# Patient Record
Sex: FEMALE | Race: WHITE
[De-identification: names, ages, dates, MRNs, and addresses within clinical notes are randomized per-mention and may not be internally consistent; named-entity substitution may affect disease eponyms.]

---

## 2019-01-08 ENCOUNTER — HOSPITAL ENCOUNTER (OUTPATIENT)
Dept: HOSPITAL 10 - SDS | Age: 69
Discharge: HOME | End: 2019-01-08
Attending: OTOLARYNGOLOGY
Payer: COMMERCIAL

## 2019-01-08 VITALS
HEIGHT: 64 IN | BODY MASS INDEX: 31.65 KG/M2 | WEIGHT: 185.39 LBS | HEIGHT: 64 IN | WEIGHT: 185.39 LBS | BODY MASS INDEX: 31.65 KG/M2

## 2019-01-08 VITALS — RESPIRATION RATE: 24 BRPM | DIASTOLIC BLOOD PRESSURE: 95 MMHG | HEART RATE: 88 BPM | SYSTOLIC BLOOD PRESSURE: 165 MMHG

## 2019-01-08 VITALS — RESPIRATION RATE: 17 BRPM | HEART RATE: 84 BPM | SYSTOLIC BLOOD PRESSURE: 133 MMHG | DIASTOLIC BLOOD PRESSURE: 49 MMHG

## 2019-01-08 VITALS — DIASTOLIC BLOOD PRESSURE: 92 MMHG | SYSTOLIC BLOOD PRESSURE: 197 MMHG | RESPIRATION RATE: 17 BRPM

## 2019-01-08 VITALS — SYSTOLIC BLOOD PRESSURE: 150 MMHG | DIASTOLIC BLOOD PRESSURE: 72 MMHG | RESPIRATION RATE: 24 BRPM | HEART RATE: 92 BPM

## 2019-01-08 VITALS — RESPIRATION RATE: 15 BRPM | HEART RATE: 88 BPM | DIASTOLIC BLOOD PRESSURE: 86 MMHG | SYSTOLIC BLOOD PRESSURE: 182 MMHG

## 2019-01-08 VITALS — SYSTOLIC BLOOD PRESSURE: 182 MMHG | HEART RATE: 88 BPM | RESPIRATION RATE: 14 BRPM | DIASTOLIC BLOOD PRESSURE: 77 MMHG

## 2019-01-08 VITALS — SYSTOLIC BLOOD PRESSURE: 143 MMHG | HEART RATE: 52 BPM | RESPIRATION RATE: 18 BRPM | DIASTOLIC BLOOD PRESSURE: 69 MMHG

## 2019-01-08 VITALS — SYSTOLIC BLOOD PRESSURE: 201 MMHG | DIASTOLIC BLOOD PRESSURE: 93 MMHG | RESPIRATION RATE: 18 BRPM

## 2019-01-08 VITALS — DIASTOLIC BLOOD PRESSURE: 70 MMHG | RESPIRATION RATE: 18 BRPM | SYSTOLIC BLOOD PRESSURE: 153 MMHG | HEART RATE: 52 BPM

## 2019-01-08 VITALS — HEART RATE: 90 BPM | RESPIRATION RATE: 15 BRPM | DIASTOLIC BLOOD PRESSURE: 68 MMHG | SYSTOLIC BLOOD PRESSURE: 154 MMHG

## 2019-01-08 VITALS — SYSTOLIC BLOOD PRESSURE: 143 MMHG | RESPIRATION RATE: 10 BRPM | DIASTOLIC BLOOD PRESSURE: 67 MMHG | HEART RATE: 86 BPM

## 2019-01-08 VITALS — HEART RATE: 84 BPM | DIASTOLIC BLOOD PRESSURE: 85 MMHG | RESPIRATION RATE: 16 BRPM | SYSTOLIC BLOOD PRESSURE: 187 MMHG

## 2019-01-08 VITALS — SYSTOLIC BLOOD PRESSURE: 143 MMHG | RESPIRATION RATE: 10 BRPM | HEART RATE: 86 BPM | DIASTOLIC BLOOD PRESSURE: 67 MMHG

## 2019-01-08 VITALS — SYSTOLIC BLOOD PRESSURE: 154 MMHG | DIASTOLIC BLOOD PRESSURE: 71 MMHG | HEART RATE: 90 BPM | RESPIRATION RATE: 23 BRPM

## 2019-01-08 VITALS — HEART RATE: 88 BPM | RESPIRATION RATE: 13 BRPM | SYSTOLIC BLOOD PRESSURE: 154 MMHG | DIASTOLIC BLOOD PRESSURE: 76 MMHG

## 2019-01-08 DIAGNOSIS — I10: ICD-10-CM

## 2019-01-08 DIAGNOSIS — J34.2: Primary | ICD-10-CM

## 2019-01-08 DIAGNOSIS — J34.3: ICD-10-CM

## 2019-01-08 DIAGNOSIS — J32.3: ICD-10-CM

## 2019-01-08 PROCEDURE — 88304 TISSUE EXAM BY PATHOLOGIST: CPT

## 2019-01-08 PROCEDURE — 30520 REPAIR OF NASAL SEPTUM: CPT

## 2019-01-08 PROCEDURE — 88311 DECALCIFY TISSUE: CPT

## 2019-01-08 PROCEDURE — 30140 RESECT INFERIOR TURBINATE: CPT

## 2019-01-08 NOTE — PAC
Date/Time of Note


Date/Time of Note


DATE: 1/8/19 


TIME: 14:11





Post-Anesthesia Notes


Post-Anesthesia Note


Last documented vital signs





Vital Signs


  Date      Temp  Pulse  Resp  B/P (MAP)   Pulse Ox  O2          O2 Flow    FiO2


Time                                                 Delivery    Rate


    1/8/19  97.8


     14:10


    1/8/19           52    18      153/70        97  Room Air


     09:39                           (97)





Activity:  WNL


Respiratory function:  WNL


Cardiovascular function:  WNL


Mental status:  Baseline


Pain reasonably controlled:  Yes


Hydration appropriate:  Yes


Nausea/Vomiting absent:  Yes


Comments


BP: 133/89


HR: 90


RR: 15


T: 97.8


SaO2: 99%











JAYDON ANDRADE MD                 Jan 8, 2019 14:12

## 2019-01-08 NOTE — PREAC
Date/Time of Note


Date/Time of Note


DATE: 1/8/19 


TIME: 12:42





Anesthesia Eval and Record


Evaluation


Time Pre-Procedure Interview


DATE: 1/8/19 


TIME: 12:42


Age


68


Sex


female


NPO:  8 hrs


Preoperative diagnosis


chronic nasal obstruction


Planned procedure


bilateral laser turbinoplasty, septoplasty, sphenoid sinus sinusotomies





Past Medical History


Past Medical History:  Includes


Cardio:  HTN


Neuro:  Other (migraines)


Renal:  Other (chronic cystitis)


GI:  Other (mandible fx s/p surgeries, peptic ulcer disease)





Surgery & Anesthesia Issues


No known issue





Meds


Anticoagulation:  No


Beta Blocker within 24 hr:  Yes


Reason Beta Blocker not given:  Bradycarida, Hypotension


Reported Medications


Multivitamin* (Daily Value*) 1 Each Tablet, 1 TAB PO DAILY, TAB


   1/8/19


Ascorbic Acid (Vitamin C) 500 Mg Tab, 1000 MG PO DAILY, TAB


   1/8/19


Magnesium Oxide (Magnesium) 250 Mg Tablet, 250 MG PO DAILY, TAB


   1/8/19


Melatonin (Melatonin) 5 Mg Tablet, 5 MG PO HS, TAB


   1/8/19


Cyclobenzaprine Hcl* (Cyclobenzaprine Hcl*) 10 Mg Tablet, 10 MG PO Q8 PRN for 


MUSCLE SPASMS, #60 TAB


   1/8/19


Trihexyphenidyl Hcl* (Trihexyphenidyl Hcl*) 2 Mg Tab, 2 MG PO QHS, #120 TAB


   1/8/19


Omega-3/Dha/Epa/Fish Oil (FISH  MG SOFTGEL) 1 Each Capsule, 1 EACH PO, 


CAP


   1/4/19


Hydrocodone Bit-Acetaminophen* (Vicodin*) 5-300 Tab, 1 TAB PO Q4H PRN for PAIN, 


TAB


   1/4/19


Metoprolol Succinate* (Toprol XL*) 25 Mg Tab.sr.24h, 25 MG PO DAILY, #30 TAB


   1/4/19


Temazepam* (Temazepam*) 30 Mg Capsule, 30 MG PO HS PRN for INSOMNIA, CAP


   1/4/19


Loxapine Succinate (Loxapine) 50 Mg Capsule, 40 MG PO PC MEALS BEDTIME, CAP


   1/4/19


Meds reviewed:  Yes





Allergies


Coded Allergies:  


     morphine (Verified  Allergy, Severe, VOMITING, 1/8/19)


Allergies Reviewed:  Yes





Labs/Studies


Labs Reviewed:  Reviewed by anesthesiologist


Pregnancy test:  N/A





Pre-procedure Exam


Last vitals





Vital Signs


  Date      Temp  Pulse  Resp  B/P (MAP)   Pulse Ox  O2          O2 Flow    FiO2


Time                                                 Delivery    Rate


    1/8/19  98.8     52    18      153/70        97  Room Air


     09:39                           (97)





Airway:  Adequate mouth opening (decreased mouth opening), Adequate thyromental 


dist


Mallampati:  Mallampati III


Teeth:  Normal


Lung:  Normal


Heart:  Normal





ASA Physical Status


ASA physical status:  2


Emergency:  None





Planned Anesthetic


General/MAC:  ETT





Planned Pain Management


Parenteral pain med, Local by surgeon





Pre-operative Attestations


Prior to commencing anesthesia and surgery, the patient was re-evaluated, there 


was verification of:


*The patient's identity


*The results of appropriate recent lab work and preoperative vital signs


*The above evaluation not changing prior to induction


*Anesthetic plan, risk benefits, alternative and complications discussed with 


patient/family; questions answered; patient/family understands, accepts and 


wishes to proceed.











JAYDON ANDRADE MD                 Jan 8, 2019 12:47

## 2019-01-08 NOTE — PDOCDIS
Discharge Instructions


DIAGNOSIS


Discharge Diagnosis


1. SEPTAL DEVIATION.


2. BILATERAL NASAL TURBINATE HYPERTROPHY.


3. SPHENOID SINUSITIS. 


4. CHRONIC NASAL OBSTRUCTION.





CONDITION


                 [id number]
Patient Condition:  Oazio6o
Good








HOME CARE INSTRUCTIONS:


                [id number]
Diet Instructions:  Auyaq2b
Regular








ACTIVITY:


     Volch2Ao
Activity Restrictions:  Wfgbt3h
Slowly Increase Activity


                                        Rest between Activity


                                        Avoid heavy lifting


                                        Avoid Heavy Housework


     [id number]
Bathing Restrictions:   Zytoo8n
Tub Bath








FOLLOW UP/APPOINTMENTS


Follow-up Plan


MY OFFICE IN 7 TO 10 DAYS.





SCHOOL/WORK RELEASE


May return to School/Work on:  Vladimir 15, 2019


May return to School/Work with:  No Restrictions











CHARLEE SRINIVASAN M.D.         Jan 8, 2019 13:57

## 2019-01-08 NOTE — NUR
TRANSFERRED TO hospitals IN STABLE CONDITION .PAIN RESOLVED .MUSTACHE DRESSING DRY AND INTACT 
.REPORT GIVEN TO DINA EMERY.

## 2019-01-08 NOTE — OPR
Date/Time of Note


Date/Time of Note


DATE: 1/8/19 


TIME: 13:49





Operative Report


Procedure Date:  Jan 8, 2019


Preoperative Diagnosis


1. SEPTAL DEVIATION.


2. BILATERAL NASAL TURBINATE HYPERTROPHY.


3. SPHENOID SINUSITIS. 


4. CHRONIC NASAL OBSTRUCTION.


Postoperative Diagnosis


SAME.


Operation/Procedure Performed


1. SEPTOPLASTY VIA SMR.


2. BILATERAL NASAL TURBINATE REDUCTION USING  NM LASER VIA SMR. 


3. SPHENOID SINUS IRRIGATION.


Surgeon


see signature line


Assistant


NONE.


Anesthesia Type:  general (WITH OT TUBE INTUBATION. 16 CC 1% LIDOCAINE WITH EPI 


1:100,000 SOLN. 4 CC TOPICAL COCCAINE )


Estimated Blood Loss:  0 - 10 ml's


Transfusion


   none


Specimen


SEPTAL CARTILAGE AND BONE.


Grafts/Implants


none


Tubes/Drains


NONE.


Complications


none


Pt Condition Post Procedure:  stable


Disposition:  PACU


Indications


TO RID INFECTION AND IMPROVE BREATHING.


Procedure Description


SEE DICTATED OPERATED REPORT.











CHARLEE SRINIVASAN M.D.         Jan 8, 2019 13:55

## 2019-01-08 NOTE — NUR
RECEIVED PATIENT FROM OR VIA BED POST BILATERAL TURBINOPLASTY SEPTOPLASTY UNDER GENERAL 
ANESTHESIA. PATIENT AWAKE ON ARRIVAL AND /93 .PATIENT ALSO C/O PAIN 5/10 .SR .ON ROOM 
AIR SATURATING.96 %.

## 2019-01-08 NOTE — HPN
Date/Time of Note


Date/Time of Note


DATE: 1/8/19 


TIME: 12:32





Interval H&P Admission Note


Pt. seen H&P reviewed:  No system changes











CHARLEE SRINIVASAN M.D.         Jan 8, 2019 12:32

## 2019-01-09 NOTE — OPR
DATE OF OPERATION:  01/08/2019

 

 

SURGEON:  Cayden Reyes MD

 

PREOPERATIVE DIAGNOSES:

1.  Septal deviation.

2.  Sphenoid sinusitis.

3.  Bilateral nasal turbinate tissue hypertrophy.

4.  Chronic nasal obstruction.

 

POSTOPERATIVE DIAGNOSES:

1.  Septal deviation.

2.  Sphenoid sinusitis.

3.  Bilateral nasal turbinate tissue hypertrophy.

4.  Chronic nasal obstruction.

 

OPERATION PERFORMED:

1.  A septoplasty using submucosal resection technique.

2.  Bilateral laser turbinoplasty procedure using 532 nanometer KTP laser using 

submucosal resection technique.

3.  Sphenoid sinus irrigation.

 

ESTIMATED BLOOD LOSS:  Approximately 10 mL.

 

COMPLICATIONS:  No complications.

 

SPECIMENS SENT TO LAB:  Septal cartilage and bone for gross and microscopic 

evaluation.

 

ANESTHETIC USED:  General anesthesia with a local infiltrate of 16 mL of 1% 

lidocaine with epinephrine 1:100,000 solution using a 23-gauge spinal needle.  

The patient also had IV Ancef and Decadron before the case was begun.  The 

patient also received topical cocaine 4% using 4 mL on cottonoids.

 

INDICATIONS:  Ms. Viviane Bradford is a 68-year-old female who has a history of 

chronic sphenoid sinusitis treated with multiple antibiotic medications which 

have met with failure.  The patient continues to have headaches as well as 

chronic nasal obstruction.  The patient has been found preoperatively to have 

septal deviation and bilaterally enlarged nasal turbinates.  The patient is 

currently scheduled for today's procedure.  Risks, benefits, and alternatives 

have been explained thoroughly to Ms. Bradford include infection, bleeding, scar 

formation, possible septal perforation as well as possible reaction to local and

general anesthetic agents used during the procedure.  She has signed consent 

once her questions were answered.

 

FINDINGS DURING PROCEDURE:  Right nasal septal deviation with papillomatous 

degenerative mucosal changes of the nasal cavity.  The patient was also found to

have chronic sphenoid sinusitis without any malignancies or tumors seen during 

the procedure.  The patient was also found to have enlarged turbinates 

bilaterally.

 

DISPOSITION:  The patient left the operating room in good and satisfactory 

condition and was extubated before being taken to the recovery room.

 

DESCRIPTION OF PROCEDURE:  The patient was taken the operating room, placed on 

the surgical table in supine position, made comfortable by the anesthesiologist,

Dr. Elise.  The patient had EKG, saturation monitoring and blood pressure cuff 

applied.  The patient was then given mask inhalation agents, placed asleep 

gently.  The patient had a previously started IV in the pre-induction area which

was infusing well.  The patient was then given IV sedation and placed under deep

anesthesia.  The patient then was successfully orotracheally intubated with 

orotracheal cuffed tube and secured to the left corner of the mouth as the eyes 

were taped for protection.  At this point, the patient's vital signs were noted 

to be stable as the table was left in the midline.  A brief time-out with 

patient identification and procedures entertained, and all were in agreement.  

At this point, the patient was draped out in usual sterile fashion using a split

sheet.  Wet towels were then placed all around the nasal area in preparation for

laser use.  At this point, the intranasal cavity was inspected and injected with

1% lidocaine with epinephrine 1:100,000 solution.  This injection was carried 

out of the nasal septum, floor of the nose and the inferior turbinate 

bilaterally.  There is also injection using an intercartilage approach along the

nasal dorsum.  Cocaine was then applied to the nose after cottonoids were soaked

with this solution and placed in the anterior ethmoid and behind the middle 

turbinate areas.  At this point, a  nanometer laser was then made ready 

at 8 noyola continuous power.  A foot pedal was used to activate the laser.  All 

personnel in the operating room were asked to place safety goggles on for their 

protection.  At this point, the straight hand laser hand feed with suction 

attachment was then made ready at 8 noyola continuous power.  The cottonoids were

then removed from the nasal cavity.  At this point, the left inferior turbinate 

was reduced in size in the submucosal space using a stabbing technique with a 

laser fiber.  The smoke was evacuated through the evacuator on the hand piece. 

The inferior turbinate on the left side was noted to shrink in size as it was 

lateralized with the nasal speculum.  This gave greater patency of the nasal 

cavity.  Right side was done in a similar fashion in a posterior direction in 

the submucosal space.  The right inferior turbinate was also reduced in size by 

lateralizing against the medial wall of the maxillary sinus.  At this point, 

this gave greater patency of the right side of the nose.  A Ortiz suction was 

then used to remove blood from the nasal cavity.  At this point, the nasal 

cavity was inspected and the patient was found to have right nasal septal 

deviation of the anterior septal margin. The procedure was performed by creating

a hemitransfixion incision on the left side of the nose and the anterior septal 

margin with a #15 Bard-Rakan sharp stainless steel blade.  A mucoperichondrial 

flap was elevated on the left side of the septum with care not to damage or tear

the flap.  The hemitransfixion incision was then completed with the same #15 

Bard-Rakan sharp stainless steel blade.  This allowed the [place] elevator to 

advance on the right side of the anterior septum.  Again, this side was elevated

with care not to tear the flap as the swinging door technique was used after the

septal cartilage was skeletonized.  A Tiago forceps was then used to remove 

the inferior aspect of the nasal septum and a posterior incision in a vertical 

direction was used to free the septum and allow it to swing back towards the 

left side.  At this point, the septum was noted to be in the midline as the 

hemitransfixion incision was closed with 4-0 Vicryl suture in simple interrupted

fashion with good mucosal edge closure.  Plication suture was then applied to 

the septum to prevent hematoma formation and to keep the septum in the midline. 

At this point, the top of the sphenoid sinus was seen and the left posterior 

aspect of the nose.  The anterior wall of the sphenoid sinus was then removed 

using a chipping technique using a small osteotome.  At this point, the left 

sphenoid sinus was entered and found to have chronic changes of the mucosa.  At 

this point, copious amounts of normal saline solution with bacitracin added was 

used to irrigate the sphenoid sinus until the irrigant was clear.  A Ortiz 

suction catheter was then used to remove bleeding, mucous and irrigant from the 

nose.  Bacitracin ointment was then applied to the nose as a packing and a 

mustache dressing applied at the end of the procedure.  Sponge count and 

instrument count was correct x3.  There were no complications during the 

procedure.  The patient was reversed from general anesthetic agent, extubated in

the operating room and taken to recovery room where she is currently doing well,

expected to be discharged home unless postoperative complications develop.

 

 

Dictated By: CAYDEN EPPS/DUSTIN

DD:    01/08/2019 14:38:53

DT:    01/08/2019 15:44:34

Conf#: [id number]

DID#:  [id number]

 

JESSICA

## 2019-02-21 ENCOUNTER — OFFICE (OUTPATIENT)
Dept: URBAN - METROPOLITAN AREA CLINIC 106 | Facility: CLINIC | Age: 69
End: 2019-02-21

## 2019-02-21 VITALS
DIASTOLIC BLOOD PRESSURE: 89 MMHG | WEIGHT: 188 LBS | SYSTOLIC BLOOD PRESSURE: 159 MMHG | HEART RATE: 61 BPM | HEIGHT: 65 IN | RESPIRATION RATE: 17 BRPM

## 2019-02-21 DIAGNOSIS — R10.9 ABDOMINAL PAIN: ICD-10-CM

## 2019-02-21 DIAGNOSIS — K85.90: ICD-10-CM

## 2019-02-21 DIAGNOSIS — D64.9 ANEMIA: ICD-10-CM

## 2019-02-21 PROCEDURE — 99204 OFFICE O/P NEW MOD 45 MIN: CPT | Performed by: INTERNAL MEDICINE

## 2019-02-21 NOTE — SERVICEHPINOTES
Today the patient came in for    Gi follow up after hospital visit, on 2/1/2019 patient went to [facility] ER for blood in the stool for one week and abdominal pain. During her time in the hospital she had labs done which showed anemia and CT scan which showed pancreatitis. The amylase and lipase level was low. At this time she continues to experience LLQ abdominal pain. Patient denies fever, nausea, vomiting, dysphagia, reflux,  change in bowel habits, constipation, diarrhea, melena, and significant change in weight. Denies shortness of breath and chest pain.

## 2019-04-02 ENCOUNTER — OFFICE (OUTPATIENT)
Dept: URBAN - METROPOLITAN AREA CLINIC 106 | Facility: CLINIC | Age: 69
End: 2019-04-02

## 2019-04-02 VITALS
HEART RATE: 63 BPM | SYSTOLIC BLOOD PRESSURE: 163 MMHG | HEIGHT: 65 IN | RESPIRATION RATE: 17 BRPM | DIASTOLIC BLOOD PRESSURE: 110 MMHG | WEIGHT: 193 LBS

## 2019-04-02 DIAGNOSIS — D50.9: ICD-10-CM

## 2019-04-02 DIAGNOSIS — D64.9 ANEMIA: ICD-10-CM

## 2019-04-02 DIAGNOSIS — R10.9 ABDOMINAL PAIN: ICD-10-CM

## 2019-04-02 DIAGNOSIS — K85.90: ICD-10-CM

## 2019-04-02 PROCEDURE — 99213 OFFICE O/P EST LOW 20 MIN: CPT | Performed by: INTERNAL MEDICINE

## 2019-04-02 NOTE — SERVICEHPINOTES
Patient came for follow up after EGD, had some gas and bloating after the test but is feeling well at this time. Denies any abdominal pain, Nausea vomiting diarrhea. EGD didn't show source of bleeding.

## 2019-07-12 ENCOUNTER — OFFICE (OUTPATIENT)
Dept: URBAN - METROPOLITAN AREA CLINIC 106 | Facility: CLINIC | Age: 69
End: 2019-07-12

## 2019-07-12 VITALS
DIASTOLIC BLOOD PRESSURE: 88 MMHG | HEIGHT: 65 IN | WEIGHT: 198 LBS | SYSTOLIC BLOOD PRESSURE: 168 MMHG | RESPIRATION RATE: 18 BRPM | HEART RATE: 70 BPM

## 2019-07-12 DIAGNOSIS — D50.9: ICD-10-CM

## 2019-07-12 DIAGNOSIS — K85.90: ICD-10-CM

## 2019-07-12 PROCEDURE — 99213 OFFICE O/P EST LOW 20 MIN: CPT | Performed by: INTERNAL MEDICINE

## 2019-07-12 NOTE — SERVICEHPINOTES
Patient came for follow up after colonoscopy, had some gas and bloating after the test but is feeling well at this time. Denies any abdominal pain, Nausea vomiting diarrhea or rectal bleeding. The recent colonoscopy was done with poor prep, we need to schedule again.

## 2019-08-30 ENCOUNTER — OFFICE (OUTPATIENT)
Dept: URBAN - METROPOLITAN AREA CLINIC 106 | Facility: CLINIC | Age: 69
End: 2019-08-30

## 2019-08-30 VITALS
WEIGHT: 197 LBS | SYSTOLIC BLOOD PRESSURE: 143 MMHG | DIASTOLIC BLOOD PRESSURE: 103 MMHG | HEIGHT: 65 IN | RESPIRATION RATE: 16 BRPM | HEART RATE: 55 BPM

## 2019-08-30 DIAGNOSIS — K64.8 INTERNAL HEMORRHOIDS: ICD-10-CM

## 2019-08-30 DIAGNOSIS — K85.90: ICD-10-CM

## 2019-08-30 DIAGNOSIS — D50.9 HX OF IRON DEFICIENCY ANEMIA: ICD-10-CM

## 2019-08-30 PROCEDURE — 99213 OFFICE O/P EST LOW 20 MIN: CPT | Performed by: INTERNAL MEDICINE

## 2019-08-30 RX ORDER — HYDROCORTISONE 25 MG/G
CREAM TOPICAL
Qty: 30 | Status: ACTIVE
Start: 2019-08-30

## 2019-08-30 NOTE — SERVICEHPINOTES
Patient came for follow up after colonoscopy, had some gas and bloating after the test but is feeling well at this time. Denies any abdominal pain, Nausea vomiting diarrhea or rectal bleeding